# Patient Record
Sex: FEMALE | Race: WHITE | NOT HISPANIC OR LATINO | Employment: FULL TIME | URBAN - METROPOLITAN AREA
[De-identification: names, ages, dates, MRNs, and addresses within clinical notes are randomized per-mention and may not be internally consistent; named-entity substitution may affect disease eponyms.]

---

## 2023-04-29 LAB
THYROTROPIN (MIU/L) IN SER/PLAS BY DETECTION LIMIT <= 0.05 MIU/L: <0.01 MIU/L (ref 0.44–3.98)
THYROXINE (T4) FREE (NG/DL) IN SER/PLAS: 4.38 NG/DL (ref 0.61–1.12)
TRIIODOTHYRONINE (T3) (NG/DL) IN SER/PLAS: 324 NG/DL (ref 60–200)

## 2023-05-01 ENCOUNTER — TELEPHONE (OUTPATIENT)
Dept: PRIMARY CARE | Facility: CLINIC | Age: 28
End: 2023-05-01
Payer: COMMERCIAL

## 2023-05-01 NOTE — TELEPHONE ENCOUNTER
----- Message from Maureen Perez MD PhD sent at 4/30/2023 10:10 AM EDT -----  Please call the patient regarding her abnormal result.  TSH abnormal. Please see your endocrinologist soon to discuss/adjust treatment.

## 2023-05-02 DIAGNOSIS — E05.00 GRAVES DISEASE: ICD-10-CM

## 2023-05-02 RX ORDER — METHIMAZOLE 5 MG/1
10 TABLET ORAL DAILY
COMMUNITY
End: 2023-05-02 | Stop reason: SDUPTHER

## 2023-05-02 RX ORDER — METHIMAZOLE 5 MG/1
10 TABLET ORAL DAILY
Qty: 60 TABLET | Refills: 0 | Status: SHIPPED | OUTPATIENT
Start: 2023-05-02 | End: 2023-06-28 | Stop reason: SDUPTHER

## 2023-05-02 NOTE — TELEPHONE ENCOUNTER
The patient was requesting a refill on Methimazole, the patient states that she was not able to get into endo. The patient states that she had her labs done this past weekend.

## 2023-06-28 DIAGNOSIS — E05.00 GRAVES DISEASE: ICD-10-CM

## 2023-06-28 RX ORDER — METHIMAZOLE 5 MG/1
10 TABLET ORAL DAILY
Qty: 180 TABLET | Refills: 3 | Status: SHIPPED | OUTPATIENT
Start: 2023-06-28

## 2023-12-08 DIAGNOSIS — T78.2XXS ANAPHYLAXIS, SEQUELA: Primary | ICD-10-CM

## 2023-12-08 RX ORDER — EPINEPHRINE 0.3 MG/.3ML
0.3 INJECTION SUBCUTANEOUS AS NEEDED
Qty: 2 EACH | Refills: 0 | Status: SHIPPED | OUTPATIENT
Start: 2023-12-08 | End: 2023-12-29 | Stop reason: SDUPTHER

## 2023-12-29 DIAGNOSIS — T78.2XXS ANAPHYLAXIS, SEQUELA: ICD-10-CM

## 2023-12-30 RX ORDER — EPINEPHRINE 0.3 MG/.3ML
0.3 INJECTION SUBCUTANEOUS AS NEEDED
Qty: 2 EACH | Refills: 0 | OUTPATIENT
Start: 2023-12-30

## 2024-01-18 PROBLEM — Z91.018 TREE NUT ALLERGY: Status: ACTIVE | Noted: 2024-01-18

## 2024-01-18 PROBLEM — Z91.010 PEANUT ALLERGY: Status: ACTIVE | Noted: 2024-01-18

## 2024-01-18 NOTE — PROGRESS NOTES
Subjective   Patient ID:   [id number]   Ely Baker is a 28 y.o. female who presents for No chief complaint on file..    No chief complaint on file.       Since last visit, 04-, patient     Review of Systems    Objective     There were no vitals taken for this visit.     Physical Exam  Constitutional:       Appearance: Normal appearance.   HENT:      Head: Normocephalic and atraumatic.      Right Ear: External ear normal. There is no impacted cerumen.      Left Ear: External ear normal. There is no impacted cerumen.      Nose: No congestion or rhinorrhea.   Eyes:      Extraocular Movements: Extraocular movements intact.      Conjunctiva/sclera: Conjunctivae normal.      Pupils: Pupils are equal, round, and reactive to light.   Cardiovascular:      Rate and Rhythm: Normal rate and regular rhythm.      Heart sounds: No murmur heard.     No friction rub. No gallop.   Pulmonary:      Effort: No respiratory distress.      Breath sounds: No wheezing, rhonchi or rales.   Skin:     General: Skin is warm and dry.   Neurological:      Mental Status: She is alert.   Psychiatric:         Mood and Affect: Mood normal.         Behavior: Behavior normal.       Assessment/Plan   {Assess/PlanSmartLinks:84393}      No problem-specific Assessment & Plan notes found for this encounter.      By signing my name below, I, Azalia Sharp, attest that this documentation has been prepared under the direction and in the presence of Hillary Barker MD.  All medical record entries made by the Scribe were at my direction and personally dictated by me. I have reviewed the chart and agree that the record accurately reflects my personal performance of the history, physical exam, discussion and plan.

## 2024-01-19 ENCOUNTER — APPOINTMENT (OUTPATIENT)
Dept: ALLERGY | Facility: CLINIC | Age: 29
End: 2024-01-19
Payer: COMMERCIAL

## 2024-02-16 ENCOUNTER — APPOINTMENT (OUTPATIENT)
Dept: ALLERGY | Facility: CLINIC | Age: 29
End: 2024-02-16
Payer: COMMERCIAL

## 2024-02-20 ENCOUNTER — APPOINTMENT (OUTPATIENT)
Dept: ALLERGY | Facility: CLINIC | Age: 29
End: 2024-02-20
Payer: COMMERCIAL

## 2024-05-09 NOTE — ASSESSMENT & PLAN NOTE
She is doing well.     Oral challenge to peanut is positive. She will continue to strictly avoid. She reacted on her lst dose. She did not react to 8 peanuts. Therefore, if she has an accidental exposure she should not have a problem

## 2024-05-09 NOTE — PATIENT INSTRUCTIONS
You are allergic to peanut. Strictly avoid and keep epi with you at all times. Also please notify all  of you allergies.

## 2024-05-09 NOTE — PROGRESS NOTES
Subjective   Patient ID:   [id number]   Ely Baker is a 28 y.o. female who presents for Food Challenge (Oral challenge to peanuts).    Chief Complaint   Patient presents with    Food Challenge     Oral challenge to peanuts      Since last visit, 4-8-2022, patient had positive Immunocap and skin prick testing to peanuts.  She states she actually had a successful oral challenge after consuming a vegan butter from  [employer].  She tolerated it well with no reaction or problem.    Patient presents for oral challenge to peanuts today.  She has had nothing to eat since last night.  She feels well and denies any itching or other symptoms.    Patient works from home for [employer].    Objective     /66   Pulse 70   Wt 63.5 kg (140 lb)   SpO2 98%   BMI 22.60 kg/m²      Physical Exam  Constitutional:       Appearance: Normal appearance.   HENT:      Head: Normocephalic and atraumatic.      Right Ear: External ear normal. There is no impacted cerumen.      Left Ear: External ear normal. There is no impacted cerumen.      Nose: No congestion or rhinorrhea.   Eyes:      Extraocular Movements: Extraocular movements intact.      Conjunctiva/sclera: Conjunctivae normal.      Pupils: Pupils are equal, round, and reactive to light.   Cardiovascular:      Rate and Rhythm: Normal rate and regular rhythm.      Heart sounds: No murmur heard.     No friction rub. No gallop.   Pulmonary:      Effort: No respiratory distress.      Breath sounds: No wheezing, rhonchi or rales.   Skin:     General: Skin is warm and dry.   Neurological:      Mental Status: She is alert.   Psychiatric:         Mood and Affect: Mood normal.         Behavior: Behavior normal.       Oral challenge to peanut is positive.     Assessment/Plan         Peanut allergy  She is doing well.     Oral challenge to peanut is positive. She will continue to strictly avoid. She reacted on her lst dose. She did not react to 8 peanuts. Therefore, if she has  an accidental exposure she should not have a problem    Tree nut allergy   Patient did not have a reaction after eating a vegan butter from  [employer] made of cashew butter.  She tolerated it well with no reaction or problem. She will need a formal oral challenge if she is interested in consuming these nuts.       By signing my name below, I, Azalia Sharp, attest that this documentation has been prepared under the direction and in the presence of Hillary Barker MD.  All medical record entries made by the Prasadibe were at my direction and personally dictated by me. I have reviewed the chart and agree that the record accurately reflects my personal performance of the history, physical exam, discussion and plan.

## 2024-05-10 ENCOUNTER — OFFICE VISIT (OUTPATIENT)
Dept: ALLERGY | Facility: CLINIC | Age: 29
End: 2024-05-10
Payer: COMMERCIAL

## 2024-05-10 VITALS
BODY MASS INDEX: 22.6 KG/M2 | DIASTOLIC BLOOD PRESSURE: 66 MMHG | SYSTOLIC BLOOD PRESSURE: 130 MMHG | OXYGEN SATURATION: 98 % | WEIGHT: 140 LBS | HEART RATE: 70 BPM

## 2024-05-10 DIAGNOSIS — Z91.010 PEANUT ALLERGY: Primary | ICD-10-CM

## 2024-05-10 PROCEDURE — 95079 INGEST CHALLENGE ADDL 60 MIN: CPT | Performed by: ALLERGY & IMMUNOLOGY

## 2024-05-10 PROCEDURE — 95076 INGEST CHALLENGE INI 120 MIN: CPT | Performed by: ALLERGY & IMMUNOLOGY

## 2024-05-10 RX ORDER — MULTIVITAMIN
1 TABLET ORAL DAILY
COMMUNITY

## 2024-05-10 RX ORDER — TRETINOIN 0.5 MG/G
CREAM TOPICAL NIGHTLY
COMMUNITY
Start: 2017-10-31

## 2024-05-10 RX ORDER — LORATADINE 10 MG/1
10 TABLET ORAL DAILY
COMMUNITY

## 2024-05-10 NOTE — ASSESSMENT & PLAN NOTE
Patient did not have a reaction after eating a vegan butter from  [employer] made of cashew butter.  She tolerated it well with no reaction or problem. She will need a formal oral challenge if she is interested in consuming these nuts.

## 2024-05-31 ENCOUNTER — APPOINTMENT (OUTPATIENT)
Dept: ALLERGY | Facility: CLINIC | Age: 29
End: 2024-05-31
Payer: COMMERCIAL

## 2024-08-30 ENCOUNTER — APPOINTMENT (OUTPATIENT)
Dept: ALLERGY | Facility: CLINIC | Age: 29
End: 2024-08-30
Payer: COMMERCIAL

## 2024-09-13 ENCOUNTER — APPOINTMENT (OUTPATIENT)
Dept: ALLERGY | Facility: CLINIC | Age: 29
End: 2024-09-13
Payer: COMMERCIAL